# Patient Record
(demographics unavailable — no encounter records)

---

## 2025-01-03 NOTE — HISTORY OF PRESENT ILLNESS
[FreeTextEntry1] : CPE [de-identified] : NICOLA MOORE  is a 50 year F presenting for CPE. she is feeling well today.   She is due for mammogram, sonogram.   no chronic meds no surgeries allergy- no drug allergies, has skin condition on shin  FH -  father- HTN , DM   mother- healthy brother healthy  social- office job, tries to exercise 3xweek classes at gym, walking dog, never smoker, occasional etoh,. no drugs, has a long term male partner, 1 child- 12 yr old daughter  Specific concerns include:   # Screenings Mammo- 12/2023 GYN/papv 2024 DEXA - never  Colonoscopy- never, declines, would like fobt  Derm- utddue for full skin check, seen recently for nummular dermatits  Dentist up to date  Ophtho/optometry- up to date   # immunizations Tdap 2013 , declines booster today  Flu vaccine this season - no , declines  Shingles vaccine - due

## 2025-01-03 NOTE — HEALTH RISK ASSESSMENT
[Excellent] : ~his/her~  mood as  excellent [Yes] : Yes [Monthly or less (1 pt)] : Monthly or less (1 point) [1 or 2 (0 pts)] : 1 or 2 (0 points) [Never (0 pts)] : Never (0 points) [No] : In the past 12 months have you used drugs other than those required for medical reasons? No [No falls in past year] : Patient reported no falls in the past year [0] : 2) Feeling down, depressed, or hopeless: Not at all (0) [PHQ-2 Negative - No further assessment needed] : PHQ-2 Negative - No further assessment needed [de-identified] : ENT, derm  [Audit-CScore] : 1 [de-identified] : gym,, walking  [de-identified] : well balanced [SDT5Glpal] : 0 [Patient reported mammogram was abnormal] : Patient reported mammogram was abnormal [Patient reported PAP Smear was normal] : Patient reported PAP Smear was normal [Patient declined colonoscopy] : Patient declined colonoscopy [HIV Test offered] : HIV Test offered [Hepatitis C test offered] : Hepatitis C test offered [Change in mental status noted] : No change in mental status noted [None] : None [With Significant Other] : lives with significant other [Employed] : employed [Significant Other] : lives with significant other [Sexually Active] : sexually active [High Risk Behavior] : no high risk behavior [Feels Safe at Home] : Feels safe at home [Fully functional (bathing, dressing, toileting, transferring, walking, feeding)] : Fully functional (bathing, dressing, toileting, transferring, walking, feeding) [Fully functional (using the telephone, shopping, preparing meals, housekeeping, doing laundry, using] : Fully functional and needs no help or supervision to perform IADLs (using the telephone, shopping, preparing meals, housekeeping, doing laundry, using transportation, managing medications and managing finances) [Reports changes in hearing] : Reports no changes in hearing [Reports changes in vision] : Reports no changes in vision [Reports changes in dental health] : Reports no changes in dental health [Smoke Detector] : smoke detector [Carbon Monoxide Detector] : carbon monoxide detector [MammogramDate] : 12/2023 [MammogramComments] : calcifications, nodules  [PapSmearDate] : 3/2023 [Never] : Never [0-4] : 0-4 [NO] : No

## 2025-01-03 NOTE — ASSESSMENT
[FreeTextEntry1] :  NICOLA MOORE presented today for annual wellness exam.   [facility] ordered STD/HIV screening ordered Gyn UTD Mammogram UTD Colonoscopy declined- will send for fobt Derm UTD Flu vaccine declined  Tdap due, declined Shingles vaccine recc covid booster recc    Encouraged 150 minutes of vigorous, weight bearing exercise per week.  Recommended low fat, well balanced diet, low in sugary beverages. support offered f/u prn or 1 year.

## 2025-07-18 NOTE — PHYSICAL EXAM
[de-identified] : Constitutional: Well-nourished, well-developed, No acute distress Respiratory:  Good respiratory effort, no SOB Lymphatic: No regional lymphadenopathy, no lymphedema Psychiatric: Pleasant and normal affect, alert and oriented x3 Musculoskeletal: normal except where as noted in regional exam  Right knee: APPEARANCE: + Small well-circumscribed collection of swelling located off the lateral joint line and overlying the fibular head, no marked deformities, no intra-articular swelling, no malalignment POSITIVE TENDERNESS:  + crepitus of the anterior knee, and tenderness of patellar retinaculum NONTENDER: jt lines b/l, patellar & quadriceps tendons, MCL/LCL, ITB at the lateral femoral condyle & Gerdy's tubercle, pes bursa.  ROM: full with some discomfort in deep knee flexion RESISTIVE TESTING: + discomfort with knee ext from deep knee flexion (stretched position), painless knee flexion.  SPECIAL TESTS: stable v/v stress. painless grind. neg Lachman's. neg ant/post drawer. neg Baldo's.   [de-identified] :  The following radiographs were ordered and read by me during this patient's visit. I reviewed each radiograph in detail with the patient and discussed the findings as highlighted below.   3 views of the right knee were obtained today that show no fracture, or dislocation. There are no degenerative changes seen. There is no malalignment. No obvious osseous abnormality. Otherwise unremarkable.

## 2025-07-18 NOTE — DISCUSSION/SUMMARY
[de-identified] : Discussed findings of today's exam and possible causes of patient's pain.  Educated patient on their most probable diagnosis of right knee lateral joint line ganglion cyst.  Reviewed possible courses of treatment, and we collaboratively decided best course of treatment at this time will include conservative management.  Patient is given reassurance that this is likely a benign ganglion cyst overlying the lateral knee area, this was visualized under ultrasound today to confirm it is fluid-filled.  Patient is only having intermittent discomfort, no specific pain from the cyst.  We discussed utilization of diagnostic/therapeutic ultrasound-guided aspiration, patient elected to defer at this time as she has an upcoming vacation to [place] and would like to wait until after that time before she considers any aspiration procedure.  Follow up as needed.  Patient appreciates and agrees with current plan.  This note was generated using dragon medical dictation software.  A reasonable effort has been made for proofreading its contents, but typos may still remain.  If there are any questions or points of clarification needed please notify my office.  Accidental fall

## 2025-07-18 NOTE — HISTORY OF PRESENT ILLNESS
[de-identified] : 51F presents for right knee she believes cyst on the lateral aspect of her right knee She denies any recent injury or inciting event She denies much pain but states that she feels pressure She states that in the past she was having knee pain when sitting for prolonged periods, she did PT and symptoms essentially resolved She currently has right knee pain that begins when she is sitting with her knee bent for prolonged periods she has pain especially upon standing, though pain resolves with ambulation. Pain rarely lasts more than a few minutes She endorses tenderness at posterior lateral aspect of right knee about the joint line She was given an anti-inflammatory by the previous orthopedist that she saw which she took sporadically.